# Patient Record
Sex: MALE | Race: WHITE | NOT HISPANIC OR LATINO | URBAN - METROPOLITAN AREA
[De-identification: names, ages, dates, MRNs, and addresses within clinical notes are randomized per-mention and may not be internally consistent; named-entity substitution may affect disease eponyms.]

---

## 2022-04-11 ENCOUNTER — APPOINTMENT (OUTPATIENT)
Dept: CT IMAGING | Facility: HOSPITAL | Age: 42
End: 2022-04-11

## 2022-04-11 ENCOUNTER — HOSPITAL ENCOUNTER (EMERGENCY)
Facility: HOSPITAL | Age: 42
Discharge: HOME | End: 2022-04-11
Attending: EMERGENCY MEDICINE | Admitting: EMERGENCY MEDICINE

## 2022-04-11 VITALS
OXYGEN SATURATION: 98 % | TEMPERATURE: 97.3 F | DIASTOLIC BLOOD PRESSURE: 66 MMHG | BODY MASS INDEX: 24.38 KG/M2 | SYSTOLIC BLOOD PRESSURE: 111 MMHG | RESPIRATION RATE: 18 BRPM | WEIGHT: 180 LBS | HEART RATE: 64 BPM | HEIGHT: 72 IN

## 2022-04-11 DIAGNOSIS — R42 DIZZINESS: Primary | ICD-10-CM

## 2022-04-11 LAB
ALBUMIN SERPL-MCNC: 4.2 G/DL (ref 3.5–5.2)
ALBUMIN/GLOB SERPL: 2.3 G/DL
ALP SERPL-CCNC: 63 U/L (ref 39–117)
ALT SERPL W P-5'-P-CCNC: 27 U/L (ref 1–41)
ANION GAP SERPL CALCULATED.3IONS-SCNC: 6 MMOL/L (ref 5–15)
AST SERPL-CCNC: 19 U/L (ref 1–40)
BASOPHILS # BLD AUTO: 0.06 10*3/MM3 (ref 0–0.2)
BASOPHILS NFR BLD AUTO: 0.8 % (ref 0–1.5)
BILIRUB SERPL-MCNC: 0.2 MG/DL (ref 0–1.2)
BILIRUB UR QL STRIP: NEGATIVE
BUN SERPL-MCNC: 12 MG/DL (ref 6–20)
BUN/CREAT SERPL: 15.2 (ref 7–25)
CALCIUM SPEC-SCNC: 8.8 MG/DL (ref 8.6–10.5)
CHLORIDE SERPL-SCNC: 109 MMOL/L (ref 98–107)
CLARITY UR: CLEAR
CO2 SERPL-SCNC: 25 MMOL/L (ref 22–29)
COLOR UR: YELLOW
CREAT SERPL-MCNC: 0.79 MG/DL (ref 0.76–1.27)
DEPRECATED RDW RBC AUTO: 42.7 FL (ref 37–54)
EGFRCR SERPLBLD CKD-EPI 2021: 115.9 ML/MIN/1.73
EOSINOPHIL # BLD AUTO: 0.25 10*3/MM3 (ref 0–0.4)
EOSINOPHIL NFR BLD AUTO: 3.4 % (ref 0.3–6.2)
ERYTHROCYTE [DISTWIDTH] IN BLOOD BY AUTOMATED COUNT: 12.8 % (ref 12.3–15.4)
GLOBULIN UR ELPH-MCNC: 1.8 GM/DL
GLUCOSE SERPL-MCNC: 94 MG/DL (ref 65–99)
GLUCOSE UR STRIP-MCNC: NEGATIVE MG/DL
HCT VFR BLD AUTO: 40.1 % (ref 37.5–51)
HGB BLD-MCNC: 14 G/DL (ref 13–17.7)
HGB UR QL STRIP.AUTO: NEGATIVE
IMM GRANULOCYTES # BLD AUTO: 0.01 10*3/MM3 (ref 0–0.05)
IMM GRANULOCYTES NFR BLD AUTO: 0.1 % (ref 0–0.5)
KETONES UR QL STRIP: NEGATIVE
LEUKOCYTE ESTERASE UR QL STRIP.AUTO: NEGATIVE
LYMPHOCYTES # BLD AUTO: 2.54 10*3/MM3 (ref 0.7–3.1)
LYMPHOCYTES NFR BLD AUTO: 34.5 % (ref 19.6–45.3)
MCH RBC QN AUTO: 32 PG (ref 26.6–33)
MCHC RBC AUTO-ENTMCNC: 34.9 G/DL (ref 31.5–35.7)
MCV RBC AUTO: 91.8 FL (ref 79–97)
MONOCYTES # BLD AUTO: 0.72 10*3/MM3 (ref 0.1–0.9)
MONOCYTES NFR BLD AUTO: 9.8 % (ref 5–12)
NEUTROPHILS NFR BLD AUTO: 3.78 10*3/MM3 (ref 1.7–7)
NEUTROPHILS NFR BLD AUTO: 51.4 % (ref 42.7–76)
NITRITE UR QL STRIP: NEGATIVE
NRBC BLD AUTO-RTO: 0 /100 WBC (ref 0–0.2)
PH UR STRIP.AUTO: 7.5 [PH] (ref 5–8)
PLATELET # BLD AUTO: 258 10*3/MM3 (ref 140–450)
PMV BLD AUTO: 10 FL (ref 6–12)
POTASSIUM SERPL-SCNC: 3.9 MMOL/L (ref 3.5–5.2)
PROT SERPL-MCNC: 6 G/DL (ref 6–8.5)
PROT UR QL STRIP: NEGATIVE
QT INTERVAL: 428 MS
RBC # BLD AUTO: 4.37 10*6/MM3 (ref 4.14–5.8)
SODIUM SERPL-SCNC: 140 MMOL/L (ref 136–145)
SP GR UR STRIP: >=1.03 (ref 1–1.03)
TROPONIN T SERPL-MCNC: <0.01 NG/ML (ref 0–0.03)
UROBILINOGEN UR QL STRIP: NORMAL
WBC NRBC COR # BLD: 7.36 10*3/MM3 (ref 3.4–10.8)

## 2022-04-11 PROCEDURE — 80053 COMPREHEN METABOLIC PANEL: CPT | Performed by: EMERGENCY MEDICINE

## 2022-04-11 PROCEDURE — 84484 ASSAY OF TROPONIN QUANT: CPT | Performed by: EMERGENCY MEDICINE

## 2022-04-11 PROCEDURE — 85025 COMPLETE CBC W/AUTO DIFF WBC: CPT | Performed by: EMERGENCY MEDICINE

## 2022-04-11 PROCEDURE — 81003 URINALYSIS AUTO W/O SCOPE: CPT | Performed by: EMERGENCY MEDICINE

## 2022-04-11 PROCEDURE — 70450 CT HEAD/BRAIN W/O DYE: CPT

## 2022-04-11 PROCEDURE — 93005 ELECTROCARDIOGRAM TRACING: CPT | Performed by: EMERGENCY MEDICINE

## 2022-04-11 PROCEDURE — 99283 EMERGENCY DEPT VISIT LOW MDM: CPT

## 2022-04-11 PROCEDURE — 93010 ELECTROCARDIOGRAM REPORT: CPT | Performed by: INTERNAL MEDICINE

## 2022-04-11 RX ORDER — SODIUM CHLORIDE 0.9 % (FLUSH) 0.9 %
10 SYRINGE (ML) INJECTION AS NEEDED
Status: DISCONTINUED | OUTPATIENT
Start: 2022-04-11 | End: 2022-04-11 | Stop reason: HOSPADM

## 2022-04-11 RX ORDER — ALPRAZOLAM 0.25 MG/1
0.5 TABLET ORAL ONCE
Status: COMPLETED | OUTPATIENT
Start: 2022-04-11 | End: 2022-04-11

## 2022-04-11 RX ADMIN — ALPRAZOLAM 0.5 MG: 0.25 TABLET ORAL at 13:27

## 2022-04-11 NOTE — ED NOTES
"Pt arrives via PV after being discharged from [facility] with c/o lightheadedness and head pressure and \"seeing spots\" since yesterday around 1700. Pt reports [facility]'s told him he was dehydrated and gave him IVF. Pt states he still feels the same. Pt a&ox4, abc's intact, NAD noted, ambulatory to triage.    Patient wearing mask during triage. RN wearing appropriate PPE during triage. Hand hygiene performed.    "

## 2022-04-11 NOTE — ED PROVIDER NOTES
" EMERGENCY DEPARTMENT ENCOUNTER    Room Number:  34/34  Date of encounter:  4/11/2022  PCP: Provider, No Known  Historian: Patient      HPI:  Chief Complaint: Multiple complaints  A complete HPI/ROS/PMH/PSH/SH/FH are unobtainable due to: None    Context: Gage Guevara is a 41 y.o. male who presents to the ED c/o multiple complaints over the last 2 days.  Patient started passing \"something just is not right\".  When I ask him to expound on that, he said that he had weakness, fatigue, myalgias, dizziness, fullness behind his eyes and intermittent spots in his vision.  He said this comes and goes but the fatigue has been constant and he considers it severe.    He has no fever, no chest pain or shortness of breath, no nausea vomiting diarrhea, he is eating and drinking normally, nothing else is changed there no new medications and he was just seen at [place] emergency department earlier today where he endorses that they told him \"he was dehydrated\".  He received some IV fluids and then left to come to our facility.      PAST MEDICAL HISTORY  Active Ambulatory Problems     Diagnosis Date Noted   • No Active Ambulatory Problems     Resolved Ambulatory Problems     Diagnosis Date Noted   • No Resolved Ambulatory Problems     No Additional Past Medical History         PAST SURGICAL HISTORY  No past surgical history on file.      FAMILY HISTORY  No family history on file.      SOCIAL HISTORY  Social History     Socioeconomic History   • Marital status: Single         ALLERGIES  Adhesive tape        REVIEW OF SYSTEMS  Review of Systems     All systems reviewed and negative except for those discussed in HPI.       PHYSICAL EXAM    I have reviewed the triage vital signs and nursing notes.    ED Triage Vitals [04/11/22 1232]   Temp Heart Rate Resp BP SpO2   97.3 °F (36.3 °C) 64 18 140/100 99 %      Temp src Heart Rate Source Patient Position BP Location FiO2 (%)   Tympanic Monitor Standing Left arm --       Physical " Exam  GENERAL: not distressed, anxious  HENT: nares patent  EYES: no scleral icterus  CV: regular rhythm, regular rate  RESPIRATORY: normal effort  ABDOMEN: soft  MUSCULOSKELETAL: no deformity  NEURO: alert, moves all extremities, follows commands  SKIN: warm, dry        LAB RESULTS  Recent Results (from the past 24 hour(s))   MAGNESIUM    Collection Time: 04/11/22  8:16 AM    Specimen: Blood   Result Value Ref Range    Magnesium 2.0 1.6 - 3.0 mg/dL   TROPONIN (IN-HOUSE)    Collection Time: 04/11/22  8:16 AM    Specimen: Blood   Result Value Ref Range    Troponin I <0.03 0.00 - 0.04 ng/mL   AUTODIFF    Collection Time: 04/11/22  8:16 AM    Specimen: Blood   Result Value Ref Range    Neutrophil % 56.5 40.0 - 75.0 %    Lymphocyte % 30.2 17.0 - 45.0 %    Monocyte % 8.7 3.0 - 12.0 %    Eosinophil % 3.5 0.0 - 7.0 %    Basophil % 1.1 0.0 - 3.0 %    Neutrophils Absolute 3.8 1.5 - 7.1 x10(3)/ul    Lymphocytes Absolute 2.0 1.0 - 3.5 x10(3)/ul    Monocytes Absolute 0.6 0.0 - 1.0 x10(3)/ul    Eosinophils Absolute 0.2 0.0 - 0.7 x10(3)/ul    Basophils Absolute 0.1 0.0 - 0.3 x10(3)/ul   CK MB    Collection Time: 04/11/22  8:16 AM    Specimen: Blood   Result Value Ref Range    CK-MB Index 1.8 0.3 - 4.0 ng/mL    CKMB 1.9 0.0 - 4.0 %   ECG 12 Lead    Collection Time: 04/11/22  1:02 PM   Result Value Ref Range    QT Interval 428 ms   Comprehensive Metabolic Panel    Collection Time: 04/11/22  1:25 PM    Specimen: Blood   Result Value Ref Range    Glucose 94 65 - 99 mg/dL    BUN 12 6 - 20 mg/dL    Creatinine 0.79 0.76 - 1.27 mg/dL    Sodium 140 136 - 145 mmol/L    Potassium 3.9 3.5 - 5.2 mmol/L    Chloride 109 (H) 98 - 107 mmol/L    CO2 25.0 22.0 - 29.0 mmol/L    Calcium 8.8 8.6 - 10.5 mg/dL    Total Protein 6.0 6.0 - 8.5 g/dL    Albumin 4.20 3.50 - 5.20 g/dL    ALT (SGPT) 27 1 - 41 U/L    AST (SGOT) 19 1 - 40 U/L    Alkaline Phosphatase 63 39 - 117 U/L    Total Bilirubin 0.2 0.0 - 1.2 mg/dL    Globulin 1.8 gm/dL    A/G Ratio 2.3 g/dL     BUN/Creatinine Ratio 15.2 7.0 - 25.0    Anion Gap 6.0 5.0 - 15.0 mmol/L    eGFR 115.9 >60.0 mL/min/1.73   CBC Auto Differential    Collection Time: 04/11/22  1:25 PM    Specimen: Blood   Result Value Ref Range    WBC 7.36 3.40 - 10.80 10*3/mm3    RBC 4.37 4.14 - 5.80 10*6/mm3    Hemoglobin 14.0 13.0 - 17.7 g/dL    Hematocrit 40.1 37.5 - 51.0 %    MCV 91.8 79.0 - 97.0 fL    MCH 32.0 26.6 - 33.0 pg    MCHC 34.9 31.5 - 35.7 g/dL    RDW 12.8 12.3 - 15.4 %    RDW-SD 42.7 37.0 - 54.0 fl    MPV 10.0 6.0 - 12.0 fL    Platelets 258 140 - 450 10*3/mm3    Neutrophil % 51.4 42.7 - 76.0 %    Lymphocyte % 34.5 19.6 - 45.3 %    Monocyte % 9.8 5.0 - 12.0 %    Eosinophil % 3.4 0.3 - 6.2 %    Basophil % 0.8 0.0 - 1.5 %    Immature Grans % 0.1 0.0 - 0.5 %    Neutrophils, Absolute 3.78 1.70 - 7.00 10*3/mm3    Lymphocytes, Absolute 2.54 0.70 - 3.10 10*3/mm3    Monocytes, Absolute 0.72 0.10 - 0.90 10*3/mm3    Eosinophils, Absolute 0.25 0.00 - 0.40 10*3/mm3    Basophils, Absolute 0.06 0.00 - 0.20 10*3/mm3    Immature Grans, Absolute 0.01 0.00 - 0.05 10*3/mm3    nRBC 0.0 0.0 - 0.2 /100 WBC   Troponin    Collection Time: 04/11/22  1:25 PM    Specimen: Blood   Result Value Ref Range    Troponin T <0.010 0.000 - 0.030 ng/mL   Urinalysis With Microscopic If Indicated (No Culture) - Urine, Clean Catch    Collection Time: 04/11/22  1:26 PM    Specimen: Urine, Clean Catch   Result Value Ref Range    Color, UA Yellow Yellow, Straw    Appearance, UA Clear Clear    pH, UA 7.5 5.0 - 8.0    Specific Gravity, UA >=1.030 1.005 - 1.030    Glucose, UA Negative Negative    Ketones, UA Negative Negative    Bilirubin, UA Negative Negative    Blood, UA Negative Negative    Protein, UA Negative Negative    Leuk Esterase, UA Negative Negative    Nitrite, UA Negative Negative    Urobilinogen, UA 0.2 E.U./dL 0.2 - 1.0 E.U./dL       Ordered the above labs and independently reviewed the results.        RADIOLOGY  CT Head Without Contrast    Result Date:  4/11/2022  CT SCAN OF THE HEAD WITHOUT CONTRAST  CLINICAL HISTORY: Headaches and visual disturbances.  CT scan of the head was obtained with 3 mm axial images. No intravenous contrast was administered.  FINDINGS:  The ventricles, sulci, and cisterns are age appropriate. The basal ganglia and thalami are unremarkable in appearance. The posterior fossa structures are within normal limits.       Unremarkable CT scan head. The etiology of the patient's headaches and visual disturbances is not further elucidated on this examination; and if further assessment is required, one could obtain an MRI of the brain for follow-up.  Radiation dose reduction techniques were utilized, including automated exposure control and exposure modulation based on body size.  This report was finalized on 4/11/2022 2:27 PM by Dr. Dhiraj Delgadillo M.D.        I ordered the above noted radiological studies. Reviewed by me and discussed with radiologist.  See dictation for official radiology interpretation.      PROCEDURES    Procedures      MEDICATIONS GIVEN IN ER    Medications   sodium chloride 0.9 % flush 10 mL (has no administration in time range)   ALPRAZolam (XANAX) tablet 0.5 mg (0.5 mg Oral Given 4/11/22 1327)         PROGRESS, DATA ANALYSIS, CONSULTS, AND MEDICAL DECISION MAKING    All labs have been independently reviewed by me.  All radiology studies have been reviewed by me and discussed with radiologist dictating the report.   EKG's independently viewed and interpreted by me.  Discussion below represents my analysis of pertinent findings related to patient's condition, differential diagnosis, treatment plan and final disposition.        ED Course as of 04/11/22 1524   Mon Apr 11, 2022   1523 CBC and chemistry unremarkable [DP]   1523 Troponin and urinalysis negative [DP]   1523 CT scan of the brain negative acute [DP]   1523 EKG on arrival  Sinus bradycardia in the 50s.  Normal QT and QRS.  There is some early repolarization of the ST  segments but no acute ST segment elevation or depression to suggest ischemia.  There is no comparison available [DP]   1523 Patient felt much better after dose of Xanax.  Lengthy conversation with the patient his family member at bedside about following with a PCP. [DP]      ED Course User Index  [DP] Kevin Hightower MD           PPE: The patient wore a surgical mask throughout the entire patient encounter. I wore an N95.    AS OF 15:24 EDT VITALS:    BP - 111/66  HR - 64  TEMP - 97.3 °F (36.3 °C) (Tympanic)  O2 SATS - 98%        DIAGNOSIS  Final diagnoses:   Dizziness         DISPOSITION  Discharge           Kevin Hightower MD  04/11/22 1522